# Patient Record
(demographics unavailable — no encounter records)

---

## 2024-12-01 NOTE — ASSESSMENT
[FreeTextEntry1] : #Lightheadedness #Dizziness #HTN Suspect lightheadedness and dizziness is iso HTN given temporal pattern (generally occur after taking medications). Also given patient recently has lost significant amount of weight, it is possible that her BP range is lower than previously on same dose. Does not measure BP at home during these episodes or in general. However BP in office ranges 120-130s/80s in last 6+ months or so. Other considerations include anemia (however last Hb wnl in 9/24 at 11.6 with recent periods less heavy), vs. hypotension iso HCTZ and drinking several teas (however pt hydrates well). Patient's ultimate goal is to downtitrate BP meds/eliminate entirely if possible while continuing lifestyle modifications - c/w HCTZ 12.5mg QD - Decrease Losartan dose to 25mg QD - Advised to keep BP log daily - Tasked Gracie Parekh to request sending patient Home BP Monitor  #Fluttering of chest Unclear etiology including cardiac arrythmia vs. iso extra-cardiac causes including hypovolemia, hyperthyroidism, anemia vs. side-effect from herbal tea (guava, guerda, spearmint, and green tea). No other cardiac Sx including CP, dyspnea, DA SILVA. Recent TSH 1.3 (2/24, wnl) and Hb 11.6 (9/24, wnl). Other considerations would be Ashwaganda which has been discontinued 1.5 months ago. No caffeine use.  - Advised to trial discontinuing herbal teas  - Advised to keep log of fluttering episodes - to f/u at [facility] in 2 weeks to do further work-up if persists including EKG, CBC, BMP   Case presented to and discussed with Attending Dr. Julian. Please RTC in 2 weeks to 1) review BP log, titrate meds as necessary and 2) work-up heart flutters if persist

## 2024-12-01 NOTE — ASSESSMENT
[FreeTextEntry1] : #Lightheadedness #Dizziness #HTN Suspect lightheadedness and dizziness is iso HTN given temporal pattern (generally occur after taking medications). Also given patient recently has lost significant amount of weight, it is possible that her BP range is lower than previously on same dose. Does not measure BP at home during these episodes or in general. However BP in office ranges 120-130s/80s in last 6+ months or so. Other considerations include anemia (however last Hb wnl in 9/24 at 11.6 with recent periods less heavy), vs. hypotension iso HCTZ and drinking several teas (however pt hydrates well). Patient's ultimate goal is to downtitrate BP meds/eliminate entirely if possible while continuing lifestyle modifications - c/w HCTZ 12.5mg QD - Decrease Losartan dose to 25mg QD - Advised to keep BP log daily - Tasked Gracie Parekh to request sending patient Home BP Monitor  #Fluttering of chest Unclear etiology including cardiac arrythmia vs. iso extra-cardiac causes including hypovolemia, hyperthyroidism, anemia vs. side-effect from herbal tea (guava, guerda, spearmint, and green tea). No other cardiac Sx including CP, dyspnea, DA SILVA. Recent TSH 1.3 (2/24, wnl) and Hb 11.6 (9/24, wnl). Other considerations would be Ashwaganda which has been discontinued 1.5 months ago. No caffeine use.  - Advised to trial discontinuing herbal teas  - Advised to keep log of fluttering episodes - to f/u at [facility] in 2 weeks to do further work-up if persists including EKG, CBC, BMP   Case presented to and discussed with Attending Dr. Julian. Please RTC in 2 weeks to 1) review BP log, titrate meds as necessary and 2) work-up heart flutters if persist

## 2024-12-01 NOTE — END OF VISIT
[] : Resident [FreeTextEntry3] : #HTN- needs to measure bp at home as getting little dizzy in the afternoon- cut down bp med for now as gets dizzy after taking bp meds. send bp monitor.   #palpitations- been two weeks- comes and goes. usually nights. see us in office soon to get ekg and recheck bp. stop herbal teas

## 2024-12-01 NOTE — HISTORY OF PRESENT ILLNESS
[Home] : at home, [unfilled] , at the time of the visit. [Medical Office: ([facility])___] : at the medical office located in  [Verbal consent obtained from patient] : the patient, [unfilled] [FreeTextEntry1] : 2015 forms and HTN medication concerns [de-identified] : Ms. Davis is a 33YO female with PMHx HTN, HAYLEE, anxiety/ depression, who presents today for follow up appointment requesting 2015 Transportation forms and with concerns of her BP medications and heart flutter x2 weeks.  Patient reports that she is compliant with her BP medications (HCTZ 12.5mg QD and Losartan 50mg QD) but notes that for the last 2 weeks, within 1 hour of taking her medications in the afternoon she begins to feel lightheaded and dizzy. She notes that she has lost >50 pounds intentionally in recent months and would like to slowly decrease her BP medications while continuing lifestyle modifications. She has not recorded her BP at home during this episodes or in general. She hydrates well (>2L daily) and denies any leg swelling.  With regards to heart flutter, this also began 2 weeks ago and is intermittent, generally Q2 days (most recently present in the last 2 days). They generally occur at night though sometimes can occur in the daytime as well, with no clear provoking factors. Generally, she occur while she is in bed watching television. She denies any associated CP, dyspnea, DA SILVA, headaches, blurred vision, or otherwise. Previously, she took Ashwaganda daily for her depression but has discontinued this for 1.5 months. The only new addition in her lifestyle is that she began drinking Guava and Rowena tea 1 month ago. She also drinks spearmint and green teas. She does not drink coffee. She has not required her Albuterol pump "in awhile" (unable to delineate most recent use). She has regular menstrual periods, which she has currently and began 2 days ago (after heart flutter episodes began). She describes them as less heavy than before. Most recent Hb wnl in 9/24 and most recent TSH wnl in 2/24.

## 2024-12-01 NOTE — HISTORY OF PRESENT ILLNESS
[Home] : at home, [unfilled] , at the time of the visit. [Medical Office: ([facility])___] : at the medical office located in  [Verbal consent obtained from patient] : the patient, [unfilled] [FreeTextEntry1] : 2015 forms and HTN medication concerns [de-identified] : Ms. Davis is a 31YO female with PMHx HTN, HAYLEE, anxiety/ depression, who presents today for follow up appointment requesting 2015 Transportation forms and with concerns of her BP medications and heart flutter x2 weeks.  Patient reports that she is compliant with her BP medications (HCTZ 12.5mg QD and Losartan 50mg QD) but notes that for the last 2 weeks, within 1 hour of taking her medications in the afternoon she begins to feel lightheaded and dizzy. She notes that she has lost >50 pounds intentionally in recent months and would like to slowly decrease her BP medications while continuing lifestyle modifications. She has not recorded her BP at home during this episodes or in general. She hydrates well (>2L daily) and denies any leg swelling.  With regards to heart flutter, this also began 2 weeks ago and is intermittent, generally Q2 days (most recently present in the last 2 days). They generally occur at night though sometimes can occur in the daytime as well, with no clear provoking factors. Generally, she occur while she is in bed watching television. She denies any associated CP, dyspnea, DA SILVA, headaches, blurred vision, or otherwise. Previously, she took Ashwaganda daily for her depression but has discontinued this for 1.5 months. The only new addition in her lifestyle is that she began drinking Guava and Rowena tea 1 month ago. She also drinks spearmint and green teas. She does not drink coffee. She has not required her Albuterol pump "in awhile" (unable to delineate most recent use). She has regular menstrual periods, which she has currently and began 2 days ago (after heart flutter episodes began). She describes them as less heavy than before. Most recent Hb wnl in 9/24 and most recent TSH wnl in 2/24.

## 2024-12-06 NOTE — REVIEW OF SYSTEMS
[Fatigue] : fatigue [Diarrhea] : diarrhea [Negative] : Psychiatric [Fever] : no fever [Chills] : no chills [Hot Flashes] : no hot flashes [Night Sweats] : no night sweats [Abdominal Pain] : no abdominal pain [Nausea] : no nausea [Constipation] : no constipation [Vomiting] : no vomiting [Heartburn] : no heartburn [Melena] : no melena

## 2024-12-06 NOTE — HISTORY OF PRESENT ILLNESS
[FreeTextEntry1] : Pt states she is here to follow up on her blood pressure.  [de-identified] : Patient is a 33yo f w PMhx of HTN, HAYLEE, anxiety, had recent TTM due to c/f fatigue which she presumed was secondary to BP medication as it happened after taking BP meds. Patient reports that  her losartan was decreased from 50mg to 25mg qd, and has been compliant w losartan as well as HCTZ 12.5mg qd. Reports that only changes in her habits otherwise was increase in teas, has green tea in the AM and guava tea in the PM. Also reports that she has been taking a weight loss tea. She reports recent changes in her bowel habits to probably about two times a day and now more watery stool. Reports primary weight loss has been due to caloric deficit, and working out. Otherwise she reports palpitations have gone away but would also like a EKG. No other questions or concerns. Denies fever, chills, c/p, palpitations, cough, SOB, abdominal pain, nausea, vomiting, diarrhea, dysuria, hematuria, LE swelling, or rash.

## 2024-12-06 NOTE — HEALTH RISK ASSESSMENT
[0] : 2) Feeling down, depressed, or hopeless: Not at all (0) [PHQ-9 Negative - No further assessment needed] : PHQ-9 Negative - No further assessment needed [Never] : Never [[id number]] : 0

## 2024-12-06 NOTE — ASSESSMENT
[FreeTextEntry1] : Patient is a 31yo f w PMhx of HTN, HAYLEE, anxiety, had recent TTM due to c/f fatigue, and here for BP check.  #Fatigue - Patient reports persistent fatigue, now it seems less likely related to HTN due to elevated BP reading in the office, - Differential is wide, can consider anemia due to underlying HAYLEE and cessation of iron infusions, will order CBC - Will order CMP to consider electrolyte abnormalities in the setting of increased stool due to weight loss tea - Advised on reducing tea intake - EKG today  #HTN, elevated in office today, 140/90. - Previously had better control w losartan 50mg qd and HCTZ 12.5mg qd, but was reduced to losartan 25mg qd due to consideration of fatigue due to better BP control w weight loss - Would return to losartan 50mg qd, rx sent  #Health Maintenance - Follows w GYN regularily - Decline Flu Shot

## 2025-02-03 NOTE — PHYSICAL EXAM
[No Acute Distress] : no acute distress [Well Nourished] : well nourished [Well Developed] : well developed [Well-Appearing] : well-appearing [Normal Sclera/Conjunctiva] : normal sclera/conjunctiva [EOMI] : extraocular movements intact [PERRL] : pupils equal round and reactive to light [Normal Outer Ear/Nose] : the outer ears and nose were normal in appearance [Normal Oropharynx] : the oropharynx was normal [No JVD] : no jugular venous distention [No Lymphadenopathy] : no lymphadenopathy [Supple] : supple [Thyroid Normal, No Nodules] : the thyroid was normal and there were no nodules present [No Respiratory Distress] : no respiratory distress  [Clear to Auscultation] : lungs were clear to auscultation bilaterally [No Accessory Muscle Use] : no accessory muscle use [Normal Rate] : normal rate  [Regular Rhythm] : with a regular rhythm [Normal S1, S2] : normal S1 and S2 [No Murmur] : no murmur heard [No Carotid Bruits] : no carotid bruits [No Abdominal Bruit] : a ~M bruit was not heard ~T in the abdomen [No Varicosities] : no varicosities [Pedal Pulses Present] : the pedal pulses are present [No Edema] : there was no peripheral edema [No Palpable Aorta] : no palpable aorta [No Extremity Clubbing/Cyanosis] : no extremity clubbing/cyanosis [Non Tender] : non-tender [Soft] : abdomen soft [Non-distended] : non-distended [No Masses] : no abdominal mass palpated [No HSM] : no HSM [Normal Bowel Sounds] : normal bowel sounds [Normal Posterior Cervical Nodes] : no posterior cervical lymphadenopathy [Normal Anterior Cervical Nodes] : no anterior cervical lymphadenopathy [No CVA Tenderness] : no CVA  tenderness [No Spinal Tenderness] : no spinal tenderness [No Joint Swelling] : no joint swelling [Grossly Normal Strength/Tone] : grossly normal strength/tone [No Rash] : no rash [Coordination Grossly Intact] : coordination grossly intact [No Focal Deficits] : no focal deficits [Normal Gait] : normal gait [Deep Tendon Reflexes (DTR)] : deep tendon reflexes were 2+ and symmetric [Normal Affect] : the affect was normal [Normal Insight/Judgement] : insight and judgment were intact

## 2025-02-03 NOTE — PHYSICAL EXAM
[No Acute Distress] : no acute distress [Well Nourished] : well nourished [Well Developed] : well developed [Well-Appearing] : well-appearing [Normal Sclera/Conjunctiva] : normal sclera/conjunctiva [EOMI] : extraocular movements intact [PERRL] : pupils equal round and reactive to light [Normal Outer Ear/Nose] : the outer ears and nose were normal in appearance [Normal Oropharynx] : the oropharynx was normal [No JVD] : no jugular venous distention [No Lymphadenopathy] : no lymphadenopathy [Supple] : supple [Thyroid Normal, No Nodules] : the thyroid was normal and there were no nodules present [No Respiratory Distress] : no respiratory distress  [Clear to Auscultation] : lungs were clear to auscultation bilaterally [No Accessory Muscle Use] : no accessory muscle use [Normal Rate] : normal rate  [Regular Rhythm] : with a regular rhythm [Normal S1, S2] : normal S1 and S2 [No Murmur] : no murmur heard [No Carotid Bruits] : no carotid bruits [No Abdominal Bruit] : a ~M bruit was not heard ~T in the abdomen [No Varicosities] : no varicosities [Pedal Pulses Present] : the pedal pulses are present [No Edema] : there was no peripheral edema [No Palpable Aorta] : no palpable aorta [No Extremity Clubbing/Cyanosis] : no extremity clubbing/cyanosis [Soft] : abdomen soft [Non Tender] : non-tender [Non-distended] : non-distended [No Masses] : no abdominal mass palpated [No HSM] : no HSM [Normal Bowel Sounds] : normal bowel sounds [Normal Posterior Cervical Nodes] : no posterior cervical lymphadenopathy [Normal Anterior Cervical Nodes] : no anterior cervical lymphadenopathy [No CVA Tenderness] : no CVA  tenderness [No Spinal Tenderness] : no spinal tenderness [No Joint Swelling] : no joint swelling [Grossly Normal Strength/Tone] : grossly normal strength/tone [No Rash] : no rash [No Focal Deficits] : no focal deficits [Coordination Grossly Intact] : coordination grossly intact [Normal Gait] : normal gait [Deep Tendon Reflexes (DTR)] : deep tendon reflexes were 2+ and symmetric [Normal Affect] : the affect was normal [Normal Insight/Judgement] : insight and judgment were intact

## 2025-02-05 NOTE — ASSESSMENT
[FreeTextEntry1] : Patient is a 31yo f w PMhx of HTN, HAYLEE, anxiety, had recent TTM due to c/f fatigue, and here for follow up for fatigue.

## 2025-02-05 NOTE — HEALTH RISK ASSESSMENT
[Little interest or pleasure doing things] : 1) Little interest or pleasure doing things [Feeling down, depressed, or hopeless] : 2) Feeling down, depressed, or hopeless [0] : 2) Feeling down, depressed, or hopeless: Not at all (0) [PHQ-9 Negative - No further assessment needed] : PHQ-9 Negative - No further assessment needed [Never] : Never [[id number]] : 0

## 2025-02-05 NOTE — HEALTH RISK ASSESSMENT
[Little interest or pleasure doing things] : 1) Little interest or pleasure doing things [Feeling down, depressed, or hopeless] : 2) Feeling down, depressed, or hopeless [0] : 2) Feeling down, depressed, or hopeless: Not at all (0) [PHQ-9 Negative - No further assessment needed] : PHQ-9 Negative - No further assessment needed [Never] : Never [[id number]] : 0

## 2025-02-05 NOTE — PLAN
[FreeTextEntry1] : #Fatigue Patient reports persistent fatigue, now it seems less likely related to HTN due to elevated BP reading in the office. Patient did not take BP medications today. Previous workup includes normal CBC, TSH, CMP. Stopped drinking tea supplements. EKG wnl  - recommend 24 hour ambulatory blood pressure monitoring   #HTN, elevated in office today, 149/93. Current medications losartan 50mg qd and HCTZ 12.5mg qd,  - discontinued hydrochlorothiazide  - start amlodipine 5mg  - patient advised to take medications in the evening  RTC in one week (to fill out forms for her work and discuss BP readings) Case discussed with Dr. Julian

## 2025-02-05 NOTE — HEALTH RISK ASSESSMENT
[Little interest or pleasure doing things] : 1) Little interest or pleasure doing things [Feeling down, depressed, or hopeless] : 2) Feeling down, depressed, or hopeless [0] : 2) Feeling down, depressed, or hopeless: Not at all (0) [PHQ-9 Negative - No further assessment needed] : PHQ-9 Negative - No further assessment needed [Never] : Never [[id number]] : 0

## 2025-02-05 NOTE — END OF VISIT
[] : Resident [FreeTextEntry3] : #HTN- she feels very tired and falls asleep at work if takes losartan and hctz together. home BPs at night 160s-170s. needs 24 hr BP monitor. trying taking meds in evening  #fatigue- been a while she felt since starting bp meds . get sleep study. is very active. when doesn't take bp meds energy feels fine.   #weight loss- 50 lbs since october- intentional through diet and exercise and teas.

## 2025-02-05 NOTE — HISTORY OF PRESENT ILLNESS
[FreeTextEntry1] : pt here for follow up.- htn [de-identified] : 32 year old female PMhx of HTN, HAYLEE, anxiety, had recent appointment due to c/f fatigue which she presumed was secondary to BP medication as it happened after taking BP meds. Patient states her fatigue has worsened to the point it is affecting her work. She endorses that she has fallen asleep at work several times. Patient now only takes her BP medications on days when she is not working. Works for [employer]. Patient states she has stopped drinking green/guava/weight loss teas. Endorses that when she is not taking her BP medications she feels fine and experiences no fatigue. Patient states she sleep 6-7 hours per night and wakes up refreshed. Symptoms only occur after taking BP medications. Denies fever, chills, chest pain, palpitations, cough, SOB, abdominal pain, nausea, vomiting, diarrhea, dysuria, hematuria, rashes.

## 2025-02-05 NOTE — HISTORY OF PRESENT ILLNESS
[FreeTextEntry1] : pt here for follow up.- htn [de-identified] : 32 year old female PMhx of HTN, HAYLEE, anxiety, had recent appointment due to c/f fatigue which she presumed was secondary to BP medication as it happened after taking BP meds. Patient states her fatigue has worsened to the point it is affecting her work. She endorses that she has fallen asleep at work several times. Patient now only takes her BP medications on days when she is not working. Works for [employer]. Patient states she has stopped drinking green/guava/weight loss teas. Endorses that when she is not taking her BP medications she feels fine and experiences no fatigue. Patient states she sleep 6-7 hours per night and wakes up refreshed. Symptoms only occur after taking BP medications. Denies fever, chills, chest pain, palpitations, cough, SOB, abdominal pain, nausea, vomiting, diarrhea, dysuria, hematuria, rashes.

## 2025-02-24 NOTE — PHYSICAL EXAM
[Normal Posterior Cervical Nodes] : no posterior cervical lymphadenopathy [Normal Anterior Cervical Nodes] : no anterior cervical lymphadenopathy [Normal] : no joint swelling and grossly normal strength and tone

## 2025-02-25 NOTE — HISTORY OF PRESENT ILLNESS
[FreeTextEntry1] : HTN [de-identified] : 32 year old female PMHx of HTN, HAYLEE, anxiety previously here 2/25 for fatigue presumed to be 2/2 to BP medications:    #Fatigue: persistent. CBC, TSH, CMP conducted at a prior visit noted to be WNL. Stopped tea supplements. Significant weight loss since 10/24 (~50lbs). Still having fatigue when taking the BP medications. No change in food consumptions recently.    #HTN: previously elevated to 149/93. Today: 146/93. Since last visit dc'ed HCTZ 12.5mg, currently only losartan 50mg qd and amlodipine 5mg. Was referred to see nephrology (Asa Manzano MD) who recommended no anti-HTN before appt. Never started amlodipine and was given combo pill losartan 50 and 12.5 HCTZ. Still have the fatigue and takes in the morning bc meds cause increased urination. s/p 24 BP monitor noted to be hypertensive and requiring medications. Notes that he medications are making her very tired and impacting her work. Fell asleep at work and was given a form b/c she has missed several days of work was given paperwork for disability if she needed to get the help she needed.    Has been working out 232 back in 5/2024 now 167lbs.    Recent hospitalization 2/20 [facility]: went to gym, rush of migraine, changes in color with vision. Took the HTN medications. Shocking feeling in feet noted to go from the foot to back.  Went EKG, CT scan, and an Xray. Was discharged same day for the headache, was given symptomatic relief with Tylenol. Was told to follow up with neurologist.     ROS: was able to sleep last night

## 2025-02-25 NOTE — REVIEW OF SYSTEMS
[Fatigue] : fatigue [Recent Change In Weight] : ~T recent weight change [Negative] : Genitourinary [Fever] : no fever [Chills] : no chills [Night Sweats] : no night sweats [Chest Pain] : no chest pain [Palpitations] : no palpitations [Leg Claudication] : no leg claudication [Lower Ext Edema] : no lower extremity edema [Orthopnea] : no orthopnea [Shortness Of Breath] : no shortness of breath [Wheezing] : no wheezing [Cough] : no cough [Back Pain] : no back pain [Dizziness] : no dizziness [Unsteady Walking] : no ataxia [Depression] : no depression

## 2025-02-25 NOTE — END OF VISIT
[] : Resident [FreeTextEntry3] : #HTN- saw renal and had 24 hr bp monitor per her and was told bp not controlled and needs bp med. need these renal records. call renal to discuss starting losartan for one week and if no issues then add a second agent as potentially maybe diuretic making her tired. she has been missing days at work recently due to all the fatigue from her bp control and went to the er last week for headaches when she stopped the bp meds and bp was high . filling out short term disability for recent days missed at work  #fatigue- should get sleep study. she associates it with bp meds. may have her see cards later but per her feels completely fine when not on meds [Time Spent: ___ minutes] : I have spent [unfilled] minutes of time on the encounter which excludes teaching and separately reported services.

## 2025-02-25 NOTE — HISTORY OF PRESENT ILLNESS
[FreeTextEntry1] : HTN [de-identified] : 32 year old female PMHx of HTN, HAYLEE, anxiety previously here 2/25 for fatigue presumed to be 2/2 to BP medications:    #Fatigue: persistent. CBC, TSH, CMP conducted at a prior visit noted to be WNL. Stopped tea supplements. Significant weight loss since 10/24 (~50lbs). Still having fatigue when taking the BP medications. No change in food consumptions recently.    #HTN: previously elevated to 149/93. Today: 146/93. Since last visit dc'ed HCTZ 12.5mg, currently only losartan 50mg qd and amlodipine 5mg. Was referred to see nephrology (Asa Manzano MD) who recommended no anti-HTN before appt. Never started amlodipine and was given combo pill losartan 50 and 12.5 HCTZ. Still have the fatigue and takes in the morning bc meds cause increased urination. s/p 24 BP monitor noted to be hypertensive and requiring medications. Notes that he medications are making her very tired and impacting her work. Fell asleep at work and was given a form b/c she has missed several days of work was given paperwork for disability if she needed to get the help she needed.    Has been working out 232 back in 5/2024 now 167lbs.    Recent hospitalization 2/20 [facility]: went to gym, rush of migraine, changes in color with vision. Took the HTN medications. Shocking feeling in feet noted to go from the foot to back.  Went EKG, CT scan, and an Xray. Was discharged same day for the headache, was given symptomatic relief with Tylenol. Was told to follow up with neurologist.     ROS: was able to sleep last night